# Patient Record
Sex: MALE | Race: WHITE | Employment: OTHER | URBAN - METROPOLITAN AREA
[De-identification: names, ages, dates, MRNs, and addresses within clinical notes are randomized per-mention and may not be internally consistent; named-entity substitution may affect disease eponyms.]

---

## 2020-01-17 ENCOUNTER — HOSPITAL ENCOUNTER (OUTPATIENT)
Dept: LAB | Age: 70
Discharge: HOME | End: 2020-01-17
Payer: MEDICARE

## 2020-01-17 DIAGNOSIS — R06.02 SHORTNESS OF BREATH: ICD-10-CM

## 2020-01-17 LAB
ANION GAP SERPL CALC-SCNC: 7 MMOL/L (ref 7–16)
BUN SERPL-MCNC: 26 MG/DL (ref 8–23)
CALCIUM SERPL-MCNC: 9.4 MG/DL (ref 8.3–10.4)
CHLORIDE SERPL-SCNC: 108 MMOL/L (ref 98–107)
CO2 SERPL-SCNC: 24 MMOL/L (ref 21–32)
CREAT SERPL-MCNC: 0.99 MG/DL (ref 0.8–1.5)
GLUCOSE SERPL-MCNC: 94 MG/DL (ref 65–100)
POTASSIUM SERPL-SCNC: 4.1 MMOL/L (ref 3.5–5.1)
SODIUM SERPL-SCNC: 139 MMOL/L (ref 136–145)

## 2020-01-17 PROCEDURE — 36415 COLL VENOUS BLD VENIPUNCTURE: CPT

## 2020-01-17 PROCEDURE — 80048 BASIC METABOLIC PNL TOTAL CA: CPT

## 2022-06-16 RX ORDER — SPIRONOLACTONE 25 MG/1
25 TABLET ORAL DAILY
Qty: 90 TABLET | Refills: 1 | Status: SHIPPED | OUTPATIENT
Start: 2022-06-16

## 2022-08-25 ENCOUNTER — OFFICE VISIT (OUTPATIENT)
Dept: CARDIOLOGY CLINIC | Age: 72
End: 2022-08-25
Payer: MEDICARE

## 2022-08-25 VITALS
DIASTOLIC BLOOD PRESSURE: 74 MMHG | SYSTOLIC BLOOD PRESSURE: 116 MMHG | HEART RATE: 80 BPM | WEIGHT: 274 LBS | HEIGHT: 75 IN | BODY MASS INDEX: 34.07 KG/M2

## 2022-08-25 DIAGNOSIS — I50.32 DIASTOLIC CHF, CHRONIC (HCC): Primary | ICD-10-CM

## 2022-08-25 DIAGNOSIS — E78.5 HYPERLIPIDEMIA LDL GOAL <70: ICD-10-CM

## 2022-08-25 DIAGNOSIS — I77.9 AORTA DISORDER (HCC): ICD-10-CM

## 2022-08-25 DIAGNOSIS — I10 ESSENTIAL (PRIMARY) HYPERTENSION: ICD-10-CM

## 2022-08-25 DIAGNOSIS — R53.1 WEAKNESS: ICD-10-CM

## 2022-08-25 PROCEDURE — 3017F COLORECTAL CA SCREEN DOC REV: CPT | Performed by: INTERNAL MEDICINE

## 2022-08-25 PROCEDURE — 1123F ACP DISCUSS/DSCN MKR DOCD: CPT | Performed by: INTERNAL MEDICINE

## 2022-08-25 PROCEDURE — G8417 CALC BMI ABV UP PARAM F/U: HCPCS | Performed by: INTERNAL MEDICINE

## 2022-08-25 PROCEDURE — G8428 CUR MEDS NOT DOCUMENT: HCPCS | Performed by: INTERNAL MEDICINE

## 2022-08-25 PROCEDURE — 99214 OFFICE O/P EST MOD 30 MIN: CPT | Performed by: INTERNAL MEDICINE

## 2022-08-25 PROCEDURE — 4004F PT TOBACCO SCREEN RCVD TLK: CPT | Performed by: INTERNAL MEDICINE

## 2022-08-25 ASSESSMENT — ENCOUNTER SYMPTOMS
COUGH: 0
ABDOMINAL PAIN: 0
APHONIA: 0
NAIL CHANGES: 0
EYE PAIN: 0
STRIDOR: 0

## 2022-08-25 NOTE — PROGRESS NOTES
[address], [address] [place] [address], [address] [place] [address]  PHONE: [phone]    SUBJECTIVE:   Trinity Franco is a 80 y.o. male 1950   seen for a follow up visit regarding the following:     Chief Complaint   Patient presents with    Congestive Heart Failure         History of present illness: 80 y.o. male presented for follow-up 8/25/22 Weakness worse working in yard     Sp Kathleen from Last 3 Encounters:   08/25/22 274 lb (124.3 kg)   02/08/22 276 lb (125.2 kg)   01/06/22 273 lb (123.8 kg)        Interval Hx:    The patient was previously seen by Dr. Aziaz Barrera. He has a history of nonobstructive coronary disease by cardiac catheterization in 2010, as well as COPD. In 2019, the patient noticed worsening swelling in lower extremities, as well as shortness  of breath. He has been told he had congestive heart failure and his symptoms have improved on diuretic therapies. Cardiac History:     Cardiac catheterization 2010 - no obstructive coronary disease identified. 2019 stress perfusion study normal  corroborated    ECG 01/10/2020 - normal sinus rhythm, RSR V1 nondiagnostic, rate 80, NC interval 166, QT interval 392 milliseconds. 1/2020 echocardiogram normal left ventricular systolic function normal diastolic function RVSP 34 mmHg a sending aorta 3.8 cm    2/19/2021 Sinus  Rhythm WITHIN NORMAL LIMITS    Left ventricle size is normal. Normal wall thickness. Normal wall motion. Normal left ventricular systolic function with a visually estimated EF of 65 - 70%. Diastolic dysfunction present  with normal LV EF.    2/8/2022 sinus rhythm, normal rate, normal NC intervals, ST wave normal, normal axis,      Assessment and Plan:    DD       [facility] CHF Meds            spironolactone (ALDACTONE) 25 MG tablet (Taking)    Sig - Route:  Take 1 tablet by mouth daily - Oral    carvedilol (COREG) 6.25 MG tablet (Taking)    Class: Historical Med    furosemide (LASIX) 40 MG tablet (Taking)    Class: Historical Med    losartan (COZAAR) 50 MG tablet (Taking)    Class: Historical Med    metOLazone (ZAROXOLYN) 2.5 MG tablet (Taking)    Class: Historical Med    pravastatin (PRAVACHOL) 80 MG tablet (Taking)    Class: Historical Med           Hypertension,     controlled improved on Aldactone  Hyperlipidemia. 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 cm .         Current Outpatient Medications   Medication Sig    spironolactone (ALDACTONE) 25 MG tablet Take 1 tablet by mouth daily    albuterol sulfate  (90 Base) MCG/ACT inhaler Inhale 2 puffs into the lungs every 6 hours as needed    carvedilol (COREG) 6.25 MG tablet Take by mouth 2 times daily (with meals)    vitamin D3 (CHOLECALCIFEROL) 125 MCG (5000 UT) TABS tablet Take by mouth daily    coenzyme Q10 100 MG CAPS capsule Take 200 mg by mouth daily    cyanocobalamin 1000 MCG/ML injection Inject 1,000 mcg into the muscle    cyclobenzaprine (FLEXERIL) 10 MG tablet Take by mouth 2 times daily    diclofenac sodium (VOLTAREN) 1 % GEL Apply topically 4 times daily    esomeprazole (NEXIUM) 40 MG delayed release capsule Take by mouth daily    famotidine (PEPCID) 40 MG tablet Take 40 mg by mouth daily    furosemide (LASIX) 40 MG tablet Take Lasix 40 mg twice daily for the next 3 days. Please have BMP checked in 7 to 10 days. losartan (COZAAR) 50 MG tablet Take by mouth daily    metOLazone (ZAROXOLYN) 2.5 MG tablet Take one 1 tablet 30 minutes before a.m. Lasix dose for the next 3 days. Hold medication if weight decreases by 5 pounds after starting.     naproxen sodium (ANAPROX) 220 MG tablet Take 220 mg by mouth    pramipexole (MIRAPEX) 0.5 MG tablet Take 1 mg by mouth    pravastatin (PRAVACHOL) 80 MG tablet Take 80 mg by mouth    tamsulosin (FLOMAX) 0.4 MG capsule Take 0.4 mg by mouth daily    tiotropium-olodaterol (STIOLTO) 2.5-2.5 MCG/ACT AERS Inhale into the lungs    raNITIdine (ZANTAC) 150 MG tablet Take 150 mg by mouth    traMADol (ULTRAM) 50 MG tablet Take 50 mg by mouth every 6 hours as needed. zolpidem (AMBIEN) 10 MG tablet Take by mouth. No current facility-administered medications for this visit. Past Medical History, Past Surgical History, Family history, Social History, and Medications were all reviewed with the patient today and updated as necessary. No Known Allergies  Past Medical History:   Diagnosis Date    Asthma     GERD (gastroesophageal reflux disease)     hiatal hernia    Hypertension     Other ill-defined conditions(799.89) occular migraines    Other ill-defined conditions(799.89)     narcolepsy    Psychiatric disorder     depression     Past Surgical History:   Procedure Laterality Date    COLONOSCOPY      ORTHOPEDIC SURGERY      arthroscopy bilat knees     Family History   Problem Relation Age of Onset    Stroke Mother     Hypertension Mother       Social History     Tobacco Use    Smoking status: Former     Packs/day: 0.25     Types: Cigarettes     Quit date: 2014     Years since quittin.6    Smokeless tobacco: Never   Substance Use Topics    Alcohol use: Yes     Alcohol/week: 11.7 standard drinks       ROS:    Review of Systems   Constitutional: Negative for fever. HENT:  Negative for stridor. Eyes:  Negative for pain. Cardiovascular:  Negative for chest pain. Respiratory:  Negative for cough. Endocrine: Negative for cold intolerance. Skin:  Negative for nail changes. Musculoskeletal:  Negative for arthritis. Gastrointestinal:  Negative for abdominal pain. Genitourinary:  Negative for dysuria. Neurological:  Negative for aphonia. Psychiatric/Behavioral:  Negative for altered mental status. Allergic/Immunologic: Negative for hives.          PHYSICAL EXAM:    /74   Pulse 80   Ht 6' 3\" (1.905 m)   Wt 274 lb (124.3 kg)   BMI 34.25 kg/m² Wt Readings from Last 3 Encounters:   08/25/22 274 lb (124.3 kg)   02/08/22 276 lb (125.2 kg)   01/06/22 273 lb (123.8 kg)     BP Readings from Last 3 Encounters:   08/25/22 116/74   02/08/22 120/64   01/06/22 128/80         Physical Exam  Vitals reviewed. HENT:      Head: Normocephalic. Right Ear: External ear normal.      Left Ear: External ear normal.      Nose: Nose normal.   Eyes:      General: No scleral icterus. Pulmonary:      Effort: Pulmonary effort is normal.   Abdominal:      General: There is no distension. Musculoskeletal:      Cervical back: Neck supple. Skin:     General: Skin is warm. Neurological:      Mental Status: He is alert. Mental status is at baseline. Medical problems and test results were reviewed with the patient today. No results found for this or any previous visit (from the past 672 hour(s)). No results found for: CHOL, CHOLPOCT, CHOLX, CHLST, CHOLV, HDL, HDLPOC, HDLC, LDL, LDLC, VLDLC, VLDL, TGLX, TRIGL    No results found for any visits on 08/25/22. Ivette Howard was seen today for congestive heart failure. Diagnoses and all orders for this visit:    Diastolic CHF, chronic ([address]    Essential (primary) hypertension  -     CBC with Auto Differential; Future  -     Basic Metabolic Panel; Future  -     Lipid Panel; Future    Hyperlipidemia LDL goal <70    Aorta disorder (HCC)    Weakness  -     External Referral to Physical Therapy    Return in about 6 months (around 2/25/2023).        Ivan Wilkins MD  8/25/2022  8:51 AM

## 2022-10-06 ENCOUNTER — TELEPHONE (OUTPATIENT)
Dept: CARDIOLOGY CLINIC | Age: 72
End: 2022-10-06

## 2022-10-06 DIAGNOSIS — I10 ESSENTIAL (PRIMARY) HYPERTENSION: ICD-10-CM

## 2022-10-06 LAB
ANION GAP SERPL CALC-SCNC: 5 MMOL/L (ref 4–13)
BASOPHILS # BLD: 0.1 K/UL (ref 0–0.2)
BASOPHILS NFR BLD: 1 % (ref 0–2)
BUN SERPL-MCNC: 16 MG/DL (ref 8–23)
CALCIUM SERPL-MCNC: 9.4 MG/DL (ref 8.3–10.4)
CHLORIDE SERPL-SCNC: 109 MMOL/L (ref 101–110)
CHOLEST SERPL-MCNC: 141 MG/DL
CO2 SERPL-SCNC: 26 MMOL/L (ref 21–32)
CREAT SERPL-MCNC: 1.1 MG/DL (ref 0.8–1.5)
DIFFERENTIAL METHOD BLD: NORMAL
EOSINOPHIL # BLD: 0.2 K/UL (ref 0–0.8)
EOSINOPHIL NFR BLD: 3 % (ref 0.5–7.8)
ERYTHROCYTE [DISTWIDTH] IN BLOOD BY AUTOMATED COUNT: 13.2 % (ref 11.9–14.6)
GLUCOSE SERPL-MCNC: 124 MG/DL (ref 65–100)
HCT VFR BLD AUTO: 47.3 % (ref 41.1–50.3)
HDLC SERPL-MCNC: 32 MG/DL (ref 40–60)
HDLC SERPL: 4.4 {RATIO}
HGB BLD-MCNC: 15.4 G/DL (ref 13.6–17.2)
IMM GRANULOCYTES # BLD AUTO: 0 K/UL (ref 0–0.5)
IMM GRANULOCYTES NFR BLD AUTO: 0 % (ref 0–5)
LDLC SERPL CALC-MCNC: 57 MG/DL
LYMPHOCYTES # BLD: 1.9 K/UL (ref 0.5–4.6)
LYMPHOCYTES NFR BLD: 22 % (ref 13–44)
MCH RBC QN AUTO: 31 PG (ref 26.1–32.9)
MCHC RBC AUTO-ENTMCNC: 32.6 G/DL (ref 31.4–35)
MCV RBC AUTO: 95.4 FL (ref 79.6–97.8)
MONOCYTES # BLD: 0.8 K/UL (ref 0.1–1.3)
MONOCYTES NFR BLD: 9 % (ref 4–12)
NEUTS SEG # BLD: 5.7 K/UL (ref 1.7–8.2)
NEUTS SEG NFR BLD: 65 % (ref 43–78)
NRBC # BLD: 0 K/UL (ref 0–0.2)
PLATELET # BLD AUTO: 262 K/UL (ref 150–450)
PMV BLD AUTO: 10.1 FL (ref 9.4–12.3)
POTASSIUM SERPL-SCNC: 4 MMOL/L (ref 3.5–5.1)
RBC # BLD AUTO: 4.96 M/UL (ref 4.23–5.6)
SODIUM SERPL-SCNC: 140 MMOL/L (ref 138–145)
TRIGL SERPL-MCNC: 260 MG/DL (ref 35–150)
VLDLC SERPL CALC-MCNC: 52 MG/DL (ref 6–23)
WBC # BLD AUTO: 8.8 K/UL (ref 4.3–11.1)

## 2022-10-06 NOTE — TELEPHONE ENCOUNTER
----- Message from Rita Baez MA sent at 10/6/2022 11:47 AM EDT -----  Regarding: FW: Shortness of Breath    ----- Message -----  From: Varun Dials  Sent: 10/6/2022  11:29 AM EDT  To: [facility] Cardiology Clinical Staff  Subject: Shortness of Breath                              I have had extreme shortness of breath on exertion the last week. No dizziness. SpO2 dropped to below 90 yesterday at PT. I called [facility] to message Dr. Enrique Rivas and was told she was no longer with the practice. I was told that it was unknown where she went. Do you have any advice for me?    Thanks, Pedro Baez

## 2022-10-06 NOTE — TELEPHONE ENCOUNTER
Pt c/o very SOB. O2 dropped below 90% at PT yesterday. -150/84. Today O2 Sats 93%. Myrtle Prince MD, Alda Aviles left practice. Pt asks if Dr. Larissa Ardon knows where she went to get appt with her? Offered Pulm Referral. Pt declines. Pt has appt to see Pulm NP on Mon 10/10/22. Pt denies CHF sx. No wt gain, swelling. Pt asks for any recs?   cgh

## 2022-10-07 NOTE — TELEPHONE ENCOUNTER
Please let the patient know that we do not know where Dr. Dequan Bar will be practicing. Hopefully it will be in the area. If the patient would like to stay at [place] I would recommend the [facility]. Regarding [facility], I would recommend Dr. Anastasia Cuadra or Dr. Sandor Puri.   That is likely who Dr. Dequan Bar but would want the patient to go to

## 2023-01-03 RX ORDER — SPIRONOLACTONE 25 MG/1
25 TABLET ORAL DAILY
Qty: 90 TABLET | Refills: 1 | Status: SHIPPED | OUTPATIENT
Start: 2023-01-03

## 2023-03-15 ENCOUNTER — OFFICE VISIT (OUTPATIENT)
Dept: CARDIOLOGY CLINIC | Age: 73
End: 2023-03-15
Payer: MEDICARE

## 2023-03-15 VITALS
DIASTOLIC BLOOD PRESSURE: 72 MMHG | HEIGHT: 75 IN | HEART RATE: 67 BPM | BODY MASS INDEX: 32.95 KG/M2 | SYSTOLIC BLOOD PRESSURE: 124 MMHG | WEIGHT: 265 LBS

## 2023-03-15 DIAGNOSIS — I50.32 DIASTOLIC CHF, CHRONIC (HCC): Primary | ICD-10-CM

## 2023-03-15 DIAGNOSIS — I77.9 AORTA DISORDER (HCC): ICD-10-CM

## 2023-03-15 DIAGNOSIS — E78.5 HYPERLIPIDEMIA LDL GOAL <70: ICD-10-CM

## 2023-03-15 DIAGNOSIS — I10 ESSENTIAL (PRIMARY) HYPERTENSION: ICD-10-CM

## 2023-03-15 PROCEDURE — G8428 CUR MEDS NOT DOCUMENT: HCPCS | Performed by: INTERNAL MEDICINE

## 2023-03-15 PROCEDURE — G8417 CALC BMI ABV UP PARAM F/U: HCPCS | Performed by: INTERNAL MEDICINE

## 2023-03-15 PROCEDURE — 3074F SYST BP LT 130 MM HG: CPT | Performed by: INTERNAL MEDICINE

## 2023-03-15 PROCEDURE — 3017F COLORECTAL CA SCREEN DOC REV: CPT | Performed by: INTERNAL MEDICINE

## 2023-03-15 PROCEDURE — 99214 OFFICE O/P EST MOD 30 MIN: CPT | Performed by: INTERNAL MEDICINE

## 2023-03-15 PROCEDURE — G8484 FLU IMMUNIZE NO ADMIN: HCPCS | Performed by: INTERNAL MEDICINE

## 2023-03-15 PROCEDURE — 3078F DIAST BP <80 MM HG: CPT | Performed by: INTERNAL MEDICINE

## 2023-03-15 PROCEDURE — 1123F ACP DISCUSS/DSCN MKR DOCD: CPT | Performed by: INTERNAL MEDICINE

## 2023-03-15 PROCEDURE — 4004F PT TOBACCO SCREEN RCVD TLK: CPT | Performed by: INTERNAL MEDICINE

## 2023-03-15 PROCEDURE — 93000 ELECTROCARDIOGRAM COMPLETE: CPT | Performed by: INTERNAL MEDICINE

## 2023-03-15 RX ORDER — SPIRONOLACTONE 25 MG/1
25 TABLET ORAL DAILY
Qty: 90 TABLET | Refills: 3 | Status: SHIPPED | OUTPATIENT
Start: 2023-03-15

## 2023-03-15 ASSESSMENT — ENCOUNTER SYMPTOMS
EYE PAIN: 0
COUGH: 0
NAIL CHANGES: 0
APHONIA: 0
ABDOMINAL PAIN: 0
STRIDOR: 0

## 2023-03-15 NOTE — PROGRESS NOTES
[address], [address] [place] [address], [address] [place] [address]  PHONE: [phone]    SUBJECTIVE:   Archana Gilmore is a 71 y.o. male 1950   seen for a follow up visit regarding the following:     Chief Complaint   Patient presents with    Hypertension         History of present illness: 71 y.o. male presented for follow-up 3/15/23     Wt Readings from Last 3 Encounters:   03/15/23 265 lb (120.2 kg)   08/25/22 274 lb (124.3 kg)   02/08/22 276 lb (125.2 kg)        Interval Hx:    The patient was previously seen by Dr. Filiberto Dickson. He has a history of nonobstructive coronary disease by cardiac catheterization in 2010, as well as COPD. In 2019, the patient noticed worsening swelling in lower extremities, as well as shortness  of breath. He has been told he had congestive heart failure and his symptoms have improved on diuretic therapies. Cardiac History:     Cardiac catheterization 2010 - no obstructive coronary disease identified. 2019 stress perfusion study normal  corroborated    ECG 01/10/2020 - normal sinus rhythm, RSR V1 nondiagnostic, rate 80, CO interval 166, QT interval 392 milliseconds. 1/2020 echocardiogram normal left ventricular systolic function normal diastolic function RVSP 34 mmHg a sending aorta 3.8 cm    2/19/2021 Sinus  Rhythm WITHIN NORMAL LIMITS    Left ventricle size is normal. Normal wall thickness. Normal wall motion. Normal left ventricular systolic function with a visually estimated EF of 65 - 70%. Diastolic dysfunction present  with normal LV EF.    2/8/2022 sinus rhythm, normal rate, normal CO intervals, ST wave normal, normal axis,  3/15/2022 Sinus  Rhythm Low voltage in precordial leads. 3/15/2023 sinus rhythm, normal rate, normal CO intervals, ST wave normal, low voltage. Assessment and Plan:    DD  [facility] CHF Meds            spironolactone (ALDACTONE) 25 MG tablet (Taking)    Sig - Route:  Take 1 tablet by mouth daily - Oral carvedilol (COREG) 6.25 MG tablet (Taking)    Class: Historical Med    furosemide (LASIX) 40 MG tablet (Taking)    Class: Historical Med    losartan (COZAAR) 50 MG tablet (Taking)    Class: Historical Med    pravastatin (PRAVACHOL) 80 MG tablet (Taking)    Class: Historical Med           Hypertension,     controlled improved on Aldactone  Hyperlipidemia. 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 cm .         Current Outpatient Medications   Medication Sig    spironolactone (ALDACTONE) 25 MG tablet Take 1 tablet by mouth daily    albuterol sulfate  (90 Base) MCG/ACT inhaler Inhale 2 puffs into the lungs every 6 hours as needed    carvedilol (COREG) 6.25 MG tablet Take by mouth 2 times daily (with meals)    coenzyme Q10 100 MG CAPS capsule Take 200 mg by mouth daily    cyanocobalamin 1000 MCG/ML injection Inject 1,000 mcg into the muscle    diclofenac sodium (VOLTAREN) 1 % GEL Apply topically 4 times daily    esomeprazole (NEXIUM) 40 MG delayed release capsule Take by mouth daily    famotidine (PEPCID) 40 MG tablet Take 40 mg by mouth daily    furosemide (LASIX) 40 MG tablet Take Lasix 40 mg twice daily for the next 3 days. Please have BMP checked in 7 to 10 days. losartan (COZAAR) 50 MG tablet Take by mouth daily    naproxen sodium (ANAPROX) 220 MG tablet Take 220 mg by mouth    pramipexole (MIRAPEX) 0.5 MG tablet Take 1 mg by mouth    pravastatin (PRAVACHOL) 80 MG tablet Take 80 mg by mouth    tamsulosin (FLOMAX) 0.4 MG capsule Take 0.4 mg by mouth daily    zolpidem (AMBIEN) 10 MG tablet Take by mouth. No current facility-administered medications for this visit. Past Medical History, Past Surgical History, Family history, Social History, and Medications were all reviewed with the patient today and updated as necessary.        No Known Allergies  Past Medical History: Diagnosis Date    Asthma     GERD (gastroesophageal reflux disease)     hiatal hernia    Hypertension     Other ill-defined conditions(799.89) occular migraines    Other ill-defined conditions(799.89)     narcolepsy    Psychiatric disorder     depression     Past Surgical History:   Procedure Laterality Date    COLONOSCOPY      ORTHOPEDIC SURGERY      arthroscopy bilat knees     Family History   Problem Relation Age of Onset    Stroke Mother     Hypertension Mother       Social History     Tobacco Use    Smoking status: Former     Packs/day: 0.25     Types: Cigarettes     Quit date: 2014     Years since quittin.2    Smokeless tobacco: Never   Substance Use Topics    Alcohol use: Yes     Alcohol/week: 11.7 standard drinks       ROS:    Review of Systems   Constitutional: Negative for fever. HENT:  Negative for stridor. Eyes:  Negative for pain. Cardiovascular:  Negative for chest pain. Respiratory:  Negative for cough. Endocrine: Negative for cold intolerance. Skin:  Negative for nail changes. Musculoskeletal:  Negative for arthritis. Gastrointestinal:  Negative for abdominal pain. Genitourinary:  Negative for dysuria. Neurological:  Negative for aphonia. Psychiatric/Behavioral:  Negative for altered mental status. Allergic/Immunologic: Negative for hives. PHYSICAL EXAM:    /72   Pulse 67   Ht 6' 3\" (1.905 m)   Wt 265 lb (120.2 kg)   BMI 33.12 kg/m²        Wt Readings from Last 3 Encounters:   03/15/23 265 lb (120.2 kg)   22 274 lb (124.3 kg)   22 276 lb (125.2 kg)     BP Readings from Last 3 Encounters:   03/15/23 124/72   22 116/74   22 120/64         Physical Exam  Vitals reviewed. HENT:      Head: Normocephalic. Right Ear: External ear normal.      Left Ear: External ear normal.      Nose: Nose normal.   Eyes:      General: No scleral icterus.   Pulmonary:      Effort: Pulmonary effort is normal.   Abdominal:      General: There is no distension. Musculoskeletal:      Cervical back: Neck supple. Skin:     General: Skin is warm. Neurological:      Mental Status: He is alert. Mental status is at baseline. Medical problems and test results were reviewed with the patient today. No results found for this or any previous visit (from the past 672 hour(s)). Lab Results   Component Value Date/Time    CHOL 141 10/06/2022 10:04 AM    HDL 32 10/06/2022 10:04 AM       No results found for any visits on 03/15/23. Deborah Hernandez was seen today for hypertension. Diagnoses and all orders for this visit:    Diastolic CHF, chronic (HCC)  -     EKG 12 Lead    Essential (primary) hypertension    Hyperlipidemia LDL goal <70    Aorta disorder ([address]    Return in about 6 months (around 9/15/2023).        Norah Gao MD  3/15/2023  9:23 AM

## 2023-07-09 ENCOUNTER — TELEPHONE (OUTPATIENT)
Dept: CARDIOLOGY | Age: 73
End: 2023-07-09

## 2023-09-21 ENCOUNTER — OFFICE VISIT (OUTPATIENT)
Age: 73
End: 2023-09-21
Payer: MEDICARE

## 2023-09-21 VITALS
HEIGHT: 75 IN | BODY MASS INDEX: 33.07 KG/M2 | WEIGHT: 266 LBS | DIASTOLIC BLOOD PRESSURE: 58 MMHG | SYSTOLIC BLOOD PRESSURE: 110 MMHG | HEART RATE: 64 BPM

## 2023-09-21 DIAGNOSIS — E78.5 HYPERLIPIDEMIA LDL GOAL <70: ICD-10-CM

## 2023-09-21 DIAGNOSIS — Z01.810 PREOP CARDIOVASCULAR EXAM: ICD-10-CM

## 2023-09-21 DIAGNOSIS — I10 ESSENTIAL (PRIMARY) HYPERTENSION: ICD-10-CM

## 2023-09-21 DIAGNOSIS — I50.32 DIASTOLIC CHF, CHRONIC (HCC): Primary | ICD-10-CM

## 2023-09-21 DIAGNOSIS — I77.9 AORTA DISORDER (HCC): ICD-10-CM

## 2023-09-21 PROCEDURE — G8417 CALC BMI ABV UP PARAM F/U: HCPCS | Performed by: INTERNAL MEDICINE

## 2023-09-21 PROCEDURE — 3017F COLORECTAL CA SCREEN DOC REV: CPT | Performed by: INTERNAL MEDICINE

## 2023-09-21 PROCEDURE — 4004F PT TOBACCO SCREEN RCVD TLK: CPT | Performed by: INTERNAL MEDICINE

## 2023-09-21 PROCEDURE — 99214 OFFICE O/P EST MOD 30 MIN: CPT | Performed by: INTERNAL MEDICINE

## 2023-09-21 PROCEDURE — 1123F ACP DISCUSS/DSCN MKR DOCD: CPT | Performed by: INTERNAL MEDICINE

## 2023-09-21 PROCEDURE — 3078F DIAST BP <80 MM HG: CPT | Performed by: INTERNAL MEDICINE

## 2023-09-21 PROCEDURE — 3074F SYST BP LT 130 MM HG: CPT | Performed by: INTERNAL MEDICINE

## 2023-09-21 PROCEDURE — G8427 DOCREV CUR MEDS BY ELIG CLIN: HCPCS | Performed by: INTERNAL MEDICINE

## 2023-09-21 RX ORDER — ACETAMINOPHEN 500 MG
1000 TABLET ORAL 2 TIMES DAILY
COMMUNITY

## 2023-09-21 RX ORDER — MODAFINIL 200 MG/1
200 TABLET ORAL DAILY
COMMUNITY

## 2023-09-21 RX ORDER — BACLOFEN 10 MG/1
10 TABLET ORAL 2 TIMES DAILY
COMMUNITY

## 2023-09-21 RX ORDER — SPIRONOLACTONE 25 MG/1
25 TABLET ORAL DAILY
Qty: 90 TABLET | Refills: 3 | Status: SHIPPED | OUTPATIENT
Start: 2023-09-21

## 2023-09-21 RX ORDER — SOY ISOFLAVONE 40 MG
500 TABLET ORAL 2 TIMES DAILY
COMMUNITY

## 2023-09-21 RX ORDER — GABAPENTIN 300 MG/1
300 CAPSULE ORAL 2 TIMES DAILY
COMMUNITY

## 2023-09-21 ASSESSMENT — ENCOUNTER SYMPTOMS
ABDOMINAL PAIN: 0
APHONIA: 0
EYE PAIN: 0
NAIL CHANGES: 0
STRIDOR: 0
COUGH: 0

## 2023-09-21 NOTE — PROGRESS NOTES
(VOLTAREN) 1 % GEL Apply topically 4 times daily    esomeprazole (NEXIUM) 40 MG delayed release capsule Take by mouth daily    famotidine (PEPCID) 40 MG tablet Take 1 tablet by mouth daily    furosemide (LASIX) 40 MG tablet Take Lasix 40 mg twice daily for the next 3 days. Please have BMP checked in 7 to 10 days. losartan (COZAAR) 50 MG tablet Take by mouth daily    pramipexole (MIRAPEX) 0.5 MG tablet Take 2 tablets by mouth daily    pravastatin (PRAVACHOL) 80 MG tablet Take 1 tablet by mouth    tamsulosin (FLOMAX) 0.4 MG capsule Take 1 capsule by mouth daily    zolpidem (AMBIEN) 10 MG tablet Take by mouth. No current facility-administered medications for this visit. Past Medical History, Past Surgical History, Family history, Social History, and Medications were all reviewed with the patient today and updated as necessary. No Known Allergies  Past Medical History:   Diagnosis Date    Asthma     GERD (gastroesophageal reflux disease)     hiatal hernia    Hypertension     Other ill-defined conditions(799.89) occular migraines    Other ill-defined conditions(799.89)     narcolepsy    Psychiatric disorder     depression     Past Surgical History:   Procedure Laterality Date    COLONOSCOPY      ORTHOPEDIC SURGERY      arthroscopy bilat knees     Family History   Problem Relation Age of Onset    Stroke Mother     Hypertension Mother       Social History     Tobacco Use    Smoking status: Former     Packs/day: .25     Types: Cigarettes     Quit date: 2014     Years since quittin.7    Smokeless tobacco: Never   Substance Use Topics    Alcohol use: Yes     Alcohol/week: 11.7 standard drinks of alcohol       ROS:    Review of Systems   Constitutional: Negative for fever. HENT:  Negative for stridor. Eyes:  Negative for pain. Cardiovascular:  Negative for chest pain. Respiratory:  Negative for cough. Endocrine: Negative for cold intolerance. Skin:  Negative for nail changes.

## 2024-03-27 ENCOUNTER — OFFICE VISIT (OUTPATIENT)
Age: 74
End: 2024-03-27
Payer: MEDICARE

## 2024-03-27 VITALS
HEART RATE: 78 BPM | HEIGHT: 72 IN | SYSTOLIC BLOOD PRESSURE: 112 MMHG | DIASTOLIC BLOOD PRESSURE: 76 MMHG | BODY MASS INDEX: 35.49 KG/M2 | WEIGHT: 262 LBS

## 2024-03-27 DIAGNOSIS — I77.9 AORTA DISORDER (HCC): ICD-10-CM

## 2024-03-27 DIAGNOSIS — R07.2 CHEST PAIN, PRECORDIAL: ICD-10-CM

## 2024-03-27 DIAGNOSIS — I50.32 DIASTOLIC CHF, CHRONIC (HCC): Primary | ICD-10-CM

## 2024-03-27 DIAGNOSIS — I10 ESSENTIAL (PRIMARY) HYPERTENSION: ICD-10-CM

## 2024-03-27 DIAGNOSIS — E78.5 HYPERLIPIDEMIA LDL GOAL <70: ICD-10-CM

## 2024-03-27 DIAGNOSIS — J44.9 CHRONIC OBSTRUCTIVE PULMONARY DISEASE, UNSPECIFIED COPD TYPE (HCC): ICD-10-CM

## 2024-03-27 PROCEDURE — 3078F DIAST BP <80 MM HG: CPT | Performed by: INTERNAL MEDICINE

## 2024-03-27 PROCEDURE — G8484 FLU IMMUNIZE NO ADMIN: HCPCS | Performed by: INTERNAL MEDICINE

## 2024-03-27 PROCEDURE — 3023F SPIROM DOC REV: CPT | Performed by: INTERNAL MEDICINE

## 2024-03-27 PROCEDURE — 3074F SYST BP LT 130 MM HG: CPT | Performed by: INTERNAL MEDICINE

## 2024-03-27 PROCEDURE — 1123F ACP DISCUSS/DSCN MKR DOCD: CPT | Performed by: INTERNAL MEDICINE

## 2024-03-27 PROCEDURE — G8428 CUR MEDS NOT DOCUMENT: HCPCS | Performed by: INTERNAL MEDICINE

## 2024-03-27 PROCEDURE — 93000 ELECTROCARDIOGRAM COMPLETE: CPT | Performed by: INTERNAL MEDICINE

## 2024-03-27 PROCEDURE — 4004F PT TOBACCO SCREEN RCVD TLK: CPT | Performed by: INTERNAL MEDICINE

## 2024-03-27 PROCEDURE — G8417 CALC BMI ABV UP PARAM F/U: HCPCS | Performed by: INTERNAL MEDICINE

## 2024-03-27 PROCEDURE — 99214 OFFICE O/P EST MOD 30 MIN: CPT | Performed by: INTERNAL MEDICINE

## 2024-03-27 PROCEDURE — 3017F COLORECTAL CA SCREEN DOC REV: CPT | Performed by: INTERNAL MEDICINE

## 2024-03-27 RX ORDER — BACLOFEN 10 MG/1
10 TABLET ORAL 3 TIMES DAILY
COMMUNITY

## 2024-03-27 RX ORDER — DULOXETIN HYDROCHLORIDE 20 MG/1
20 CAPSULE, DELAYED RELEASE ORAL DAILY
COMMUNITY

## 2024-03-27 RX ORDER — NITROGLYCERIN 0.4 MG/1
0.4 TABLET SUBLINGUAL EVERY 5 MIN PRN
Qty: 25 TABLET | Refills: 3 | Status: SHIPPED | OUTPATIENT
Start: 2024-03-27

## 2024-03-27 ASSESSMENT — ENCOUNTER SYMPTOMS
NAIL CHANGES: 0
STRIDOR: 0
COUGH: 0
APHONIA: 0
EYE PAIN: 0
ABDOMINAL PAIN: 0

## 2024-03-27 NOTE — PATIENT INSTRUCTIONS
[facility]  [address] [address]  [facility]  [phone]

## 2024-03-27 NOTE — PROGRESS NOTES
[address], [address]  [place] [address]  PHONE: [phone]    SUBJECTIVE:   Hernandez Andrade is a 73 y.o. male 1950   seen for a follow up visit regarding the following:     Chief Complaint   Patient presents with    Congestive Heart Failure         History of present illness: 73 y.o. male presented for follow-up 3/27/24 history of pneumothorax wedge resection. Has had chest pressure since last appt. He has a son with Schizophrenia.     Wt Readings from Last 3 Encounters:   03/27/24 118.8 kg (262 lb)   09/21/23 120.7 kg (266 lb)   03/15/23 120.2 kg (265 lb)        Interval Hx:    The patient was previously seen by Dr. Pedro Pablo Boss.  He has a history of nonobstructive coronary disease by cardiac catheterization in 2010, as well as COPD.  In 2019, the patient noticed worsening swelling in lower extremities, as well as shortness  of breath.  He has been told he had congestive heart failure and his symptoms have improved on diuretic therapies.        Cardiac History:     Cardiac catheterization 2010 - no obstructive coronary disease identified.      2019 stress perfusion study normal  uncorroborated    ECG 01/10/2020 - normal sinus rhythm, RSR V1 nondiagnostic, rate 80, UT interval 166, QT interval 392 milliseconds.      1/2020 echocardiogram normal left ventricular systolic function normal diastolic function RVSP 34 mmHg a sending aorta 3.8 cm    2/19/2021 Sinus  Rhythm WITHIN NORMAL LIMITS    Left ventricle size is normal. Normal wall thickness. Normal wall motion. Normal left ventricular systolic function with a visually estimated EF of 65 - 70%. Diastolic dysfunction present  with normal LV EF.    2/8/2022 sinus rhythm, normal rate, normal UT intervals, ST wave normal, normal axis,  3/15/2022 Sinus  Rhythm Low voltage in precordial leads.   3/15/2023 sinus rhythm, normal rate, normal UT intervals, ST wave normal, low voltage.   7/3/23: VATS left wedge resection of 2

## 2024-05-09 ENCOUNTER — TELEPHONE (OUTPATIENT)
Age: 74
End: 2024-05-09

## 2024-05-09 NOTE — TELEPHONE ENCOUNTER
Patient stated that he received test results on [facility] and that there is a comment from  about getting a CT scheduled.. Patient does want to get it done if  is willing to order.

## 2024-05-10 ENCOUNTER — PATIENT MESSAGE (OUTPATIENT)
Age: 74
End: 2024-05-10

## 2024-05-10 DIAGNOSIS — R22.2 CHEST MASS: ICD-10-CM

## 2024-05-10 DIAGNOSIS — R22.32 MASS OF LEFT AXILLA: Primary | ICD-10-CM

## 2024-05-15 ENCOUNTER — HOSPITAL ENCOUNTER (OUTPATIENT)
Dept: CT IMAGING | Age: 74
Discharge: HOME | End: 2024-05-18
Attending: INTERNAL MEDICINE
Payer: MEDICARE

## 2024-05-15 DIAGNOSIS — R22.2 CHEST MASS: ICD-10-CM

## 2024-05-15 DIAGNOSIS — R22.32 MASS OF LEFT AXILLA: ICD-10-CM

## 2024-05-15 LAB — CREAT BLD-MCNC: 0.58 MG/DL (ref 0.8–1.5)

## 2024-05-15 PROCEDURE — 6360000004 HC RX CONTRAST MEDICATION: Performed by: INTERNAL MEDICINE

## 2024-05-15 PROCEDURE — 82565 ASSAY OF CREATININE: CPT

## 2024-05-15 PROCEDURE — 71260 CT THORAX DX C+: CPT

## 2024-05-15 RX ADMIN — IOPAMIDOL 80 ML: 755 INJECTION, SOLUTION INTRAVENOUS at 16:17

## 2024-10-02 ENCOUNTER — OFFICE VISIT (OUTPATIENT)
Age: 74
End: 2024-10-02

## 2024-10-02 VITALS
DIASTOLIC BLOOD PRESSURE: 62 MMHG | HEART RATE: 61 BPM | WEIGHT: 272 LBS | HEIGHT: 75 IN | BODY MASS INDEX: 33.82 KG/M2 | SYSTOLIC BLOOD PRESSURE: 108 MMHG

## 2024-10-02 DIAGNOSIS — Z18.83 CEMENT PULMONARY EMBOLUS, UNSPECIFIED WHETHER ACUTE COR PULMONALE PRESENT (HCC): Primary | ICD-10-CM

## 2024-10-02 DIAGNOSIS — I77.9 AORTA DISORDER (HCC): ICD-10-CM

## 2024-10-02 DIAGNOSIS — I26.95 CEMENT PULMONARY EMBOLUS, UNSPECIFIED WHETHER ACUTE COR PULMONALE PRESENT (HCC): Primary | ICD-10-CM

## 2024-10-02 DIAGNOSIS — T84.81XA CEMENT PULMONARY EMBOLUS, UNSPECIFIED WHETHER ACUTE COR PULMONALE PRESENT (HCC): Primary | ICD-10-CM

## 2024-10-02 RX ORDER — CITALOPRAM HYDROBROMIDE 20 MG/1
20 TABLET ORAL DAILY
COMMUNITY
Start: 2024-07-19 | End: 2025-07-14

## 2024-10-02 RX ORDER — SPIRONOLACTONE 25 MG/1
25 TABLET ORAL DAILY
Qty: 90 TABLET | Refills: 3 | Status: SHIPPED | OUTPATIENT
Start: 2024-10-02

## 2024-10-02 RX ORDER — POTASSIUM CHLORIDE 1500 MG/1
20 TABLET, EXTENDED RELEASE ORAL DAILY
COMMUNITY
Start: 2024-07-19 | End: 2025-07-19

## 2024-10-02 ASSESSMENT — ENCOUNTER SYMPTOMS
EYE PAIN: 0
COUGH: 0
NAIL CHANGES: 0
ABDOMINAL PAIN: 0
APHONIA: 0
STRIDOR: 0

## 2024-10-02 NOTE — PROGRESS NOTES
failure and shortness of breath.    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       Salvatore Monson MD  10/2/2024  10:16 AM

## 2025-04-10 ENCOUNTER — OFFICE VISIT (OUTPATIENT)
Age: 75
End: 2025-04-10
Payer: MEDICARE

## 2025-04-10 VITALS
DIASTOLIC BLOOD PRESSURE: 66 MMHG | HEART RATE: 72 BPM | BODY MASS INDEX: 33.57 KG/M2 | SYSTOLIC BLOOD PRESSURE: 130 MMHG | WEIGHT: 270 LBS | HEIGHT: 75 IN

## 2025-04-10 DIAGNOSIS — E78.5 HYPERLIPIDEMIA LDL GOAL <70: ICD-10-CM

## 2025-04-10 DIAGNOSIS — I10 ESSENTIAL (PRIMARY) HYPERTENSION: ICD-10-CM

## 2025-04-10 DIAGNOSIS — I77.9 AORTA DISORDER: ICD-10-CM

## 2025-04-10 DIAGNOSIS — I50.32 DIASTOLIC CHF, CHRONIC (HCC): Primary | ICD-10-CM

## 2025-04-10 PROCEDURE — 99214 OFFICE O/P EST MOD 30 MIN: CPT | Performed by: INTERNAL MEDICINE

## 2025-04-10 PROCEDURE — 3017F COLORECTAL CA SCREEN DOC REV: CPT | Performed by: INTERNAL MEDICINE

## 2025-04-10 PROCEDURE — 1126F AMNT PAIN NOTED NONE PRSNT: CPT | Performed by: INTERNAL MEDICINE

## 2025-04-10 PROCEDURE — 4004F PT TOBACCO SCREEN RCVD TLK: CPT | Performed by: INTERNAL MEDICINE

## 2025-04-10 PROCEDURE — 3078F DIAST BP <80 MM HG: CPT | Performed by: INTERNAL MEDICINE

## 2025-04-10 PROCEDURE — G8417 CALC BMI ABV UP PARAM F/U: HCPCS | Performed by: INTERNAL MEDICINE

## 2025-04-10 PROCEDURE — G8428 CUR MEDS NOT DOCUMENT: HCPCS | Performed by: INTERNAL MEDICINE

## 2025-04-10 PROCEDURE — 1123F ACP DISCUSS/DSCN MKR DOCD: CPT | Performed by: INTERNAL MEDICINE

## 2025-04-10 PROCEDURE — 3075F SYST BP GE 130 - 139MM HG: CPT | Performed by: INTERNAL MEDICINE

## 2025-04-10 RX ORDER — SPIRONOLACTONE 25 MG/1
25 TABLET ORAL DAILY
Qty: 90 TABLET | Refills: 3 | Status: SHIPPED | OUTPATIENT
Start: 2025-04-10

## 2025-04-10 NOTE — PROGRESS NOTES
(or guardian, if applicable) and other individuals in attendance with the patient were advised that Artificial Intelligence will be utilized during this visit to record, process the conversation to generate a clinical note, and support improvement of the AI technology. The patient (or guardian, if applicable) and other individuals in attendance at the appointment consented to the use of AI, including the recording.